# Patient Record
Sex: MALE | Race: OTHER | URBAN - METROPOLITAN AREA
[De-identification: names, ages, dates, MRNs, and addresses within clinical notes are randomized per-mention and may not be internally consistent; named-entity substitution may affect disease eponyms.]

---

## 2019-05-25 ENCOUNTER — EMERGENCY (EMERGENCY)
Facility: HOSPITAL | Age: 74
LOS: 0 days | Discharge: HOME | End: 2019-05-25
Attending: EMERGENCY MEDICINE | Admitting: EMERGENCY MEDICINE
Payer: MEDICARE

## 2019-05-25 VITALS
WEIGHT: 179.02 LBS | HEART RATE: 68 BPM | SYSTOLIC BLOOD PRESSURE: 134 MMHG | OXYGEN SATURATION: 98 % | DIASTOLIC BLOOD PRESSURE: 89 MMHG | TEMPERATURE: 97 F | RESPIRATION RATE: 18 BRPM | HEIGHT: 64 IN

## 2019-05-25 DIAGNOSIS — R21 RASH AND OTHER NONSPECIFIC SKIN ERUPTION: ICD-10-CM

## 2019-05-25 DIAGNOSIS — R06.02 SHORTNESS OF BREATH: ICD-10-CM

## 2019-05-25 DIAGNOSIS — R06.2 WHEEZING: ICD-10-CM

## 2019-05-25 DIAGNOSIS — J34.89 OTHER SPECIFIED DISORDERS OF NOSE AND NASAL SINUSES: ICD-10-CM

## 2019-05-25 DIAGNOSIS — Z95.5 PRESENCE OF CORONARY ANGIOPLASTY IMPLANT AND GRAFT: Chronic | ICD-10-CM

## 2019-05-25 DIAGNOSIS — J20.9 ACUTE BRONCHITIS, UNSPECIFIED: ICD-10-CM

## 2019-05-25 LAB
ALBUMIN SERPL ELPH-MCNC: 4 G/DL — SIGNIFICANT CHANGE UP (ref 3.5–5.2)
ALP SERPL-CCNC: 91 U/L — SIGNIFICANT CHANGE UP (ref 30–115)
ALT FLD-CCNC: 38 U/L — SIGNIFICANT CHANGE UP (ref 0–41)
ANION GAP SERPL CALC-SCNC: 8 MMOL/L — SIGNIFICANT CHANGE UP (ref 7–14)
APTT BLD: 30.3 SEC — SIGNIFICANT CHANGE UP (ref 27–39.2)
AST SERPL-CCNC: 23 U/L — SIGNIFICANT CHANGE UP (ref 0–41)
BASE EXCESS BLDV CALC-SCNC: 1.7 MMOL/L — SIGNIFICANT CHANGE UP (ref -2–2)
BASOPHILS # BLD AUTO: 0.03 K/UL — SIGNIFICANT CHANGE UP (ref 0–0.2)
BASOPHILS NFR BLD AUTO: 0.4 % — SIGNIFICANT CHANGE UP (ref 0–1)
BILIRUB SERPL-MCNC: 0.4 MG/DL — SIGNIFICANT CHANGE UP (ref 0.2–1.2)
BUN SERPL-MCNC: 11 MG/DL — SIGNIFICANT CHANGE UP (ref 10–20)
CA-I SERPL-SCNC: 1.22 MMOL/L — SIGNIFICANT CHANGE UP (ref 1.12–1.3)
CALCIUM SERPL-MCNC: 8.9 MG/DL — SIGNIFICANT CHANGE UP (ref 8.5–10.1)
CHLORIDE SERPL-SCNC: 106 MMOL/L — SIGNIFICANT CHANGE UP (ref 98–110)
CO2 SERPL-SCNC: 25 MMOL/L — SIGNIFICANT CHANGE UP (ref 17–32)
CREAT SERPL-MCNC: 0.9 MG/DL — SIGNIFICANT CHANGE UP (ref 0.7–1.5)
EOSINOPHIL # BLD AUTO: 0.2 K/UL — SIGNIFICANT CHANGE UP (ref 0–0.7)
EOSINOPHIL NFR BLD AUTO: 2.9 % — SIGNIFICANT CHANGE UP (ref 0–8)
GAS PNL BLDV: 142 MMOL/L — SIGNIFICANT CHANGE UP (ref 136–145)
GAS PNL BLDV: SIGNIFICANT CHANGE UP
GAS PNL BLDV: SIGNIFICANT CHANGE UP
GLUCOSE SERPL-MCNC: 89 MG/DL — SIGNIFICANT CHANGE UP (ref 70–99)
HCO3 BLDV-SCNC: 30 MMOL/L — HIGH (ref 22–29)
HCT VFR BLD CALC: 50.2 % — SIGNIFICANT CHANGE UP (ref 42–52)
HCT VFR BLDA CALC: 50.8 % — HIGH (ref 34–44)
HGB BLD CALC-MCNC: 16.6 G/DL — SIGNIFICANT CHANGE UP (ref 14–18)
HGB BLD-MCNC: 16.1 G/DL — SIGNIFICANT CHANGE UP (ref 14–18)
IMM GRANULOCYTES NFR BLD AUTO: 0.4 % — HIGH (ref 0.1–0.3)
INR BLD: 0.96 RATIO — SIGNIFICANT CHANGE UP (ref 0.65–1.3)
LACTATE BLDV-MCNC: 1.3 MMOL/L — SIGNIFICANT CHANGE UP (ref 0.5–1.6)
LYMPHOCYTES # BLD AUTO: 0.94 K/UL — LOW (ref 1.2–3.4)
LYMPHOCYTES # BLD AUTO: 13.5 % — LOW (ref 20.5–51.1)
MCHC RBC-ENTMCNC: 30.1 PG — SIGNIFICANT CHANGE UP (ref 27–31)
MCHC RBC-ENTMCNC: 32.1 G/DL — SIGNIFICANT CHANGE UP (ref 32–37)
MCV RBC AUTO: 94 FL — SIGNIFICANT CHANGE UP (ref 80–94)
MONOCYTES # BLD AUTO: 0.63 K/UL — HIGH (ref 0.1–0.6)
MONOCYTES NFR BLD AUTO: 9.1 % — SIGNIFICANT CHANGE UP (ref 1.7–9.3)
NEUTROPHILS # BLD AUTO: 5.12 K/UL — SIGNIFICANT CHANGE UP (ref 1.4–6.5)
NEUTROPHILS NFR BLD AUTO: 73.7 % — SIGNIFICANT CHANGE UP (ref 42.2–75.2)
NRBC # BLD: 0 /100 WBCS — SIGNIFICANT CHANGE UP (ref 0–0)
PCO2 BLDV: 61 MMHG — HIGH (ref 41–51)
PH BLDV: 7.3 — SIGNIFICANT CHANGE UP (ref 7.26–7.43)
PLATELET # BLD AUTO: 150 K/UL — SIGNIFICANT CHANGE UP (ref 130–400)
PO2 BLDV: 16 MMHG — LOW (ref 20–40)
POTASSIUM BLDV-SCNC: 4.4 MMOL/L — SIGNIFICANT CHANGE UP (ref 3.3–5.6)
POTASSIUM SERPL-MCNC: 5.9 MMOL/L — HIGH (ref 3.5–5)
POTASSIUM SERPL-SCNC: 5.9 MMOL/L — HIGH (ref 3.5–5)
PROT SERPL-MCNC: 6.5 G/DL — SIGNIFICANT CHANGE UP (ref 6–8)
PROTHROM AB SERPL-ACNC: 11.1 SEC — SIGNIFICANT CHANGE UP (ref 9.95–12.87)
RBC # BLD: 5.34 M/UL — SIGNIFICANT CHANGE UP (ref 4.7–6.1)
RBC # FLD: 14 % — SIGNIFICANT CHANGE UP (ref 11.5–14.5)
SAO2 % BLDV: 22 % — SIGNIFICANT CHANGE UP
SODIUM SERPL-SCNC: 139 MMOL/L — SIGNIFICANT CHANGE UP (ref 135–146)
WBC # BLD: 6.95 K/UL — SIGNIFICANT CHANGE UP (ref 4.8–10.8)
WBC # FLD AUTO: 6.95 K/UL — SIGNIFICANT CHANGE UP (ref 4.8–10.8)

## 2019-05-25 PROCEDURE — 71046 X-RAY EXAM CHEST 2 VIEWS: CPT | Mod: 26

## 2019-05-25 PROCEDURE — 99284 EMERGENCY DEPT VISIT MOD MDM: CPT

## 2019-05-25 RX ORDER — ACETAMINOPHEN WITH CODEINE 300MG-30MG
1 TABLET ORAL ONCE
Refills: 0 | Status: DISCONTINUED | OUTPATIENT
Start: 2019-05-25 | End: 2019-05-25

## 2019-05-25 RX ORDER — SODIUM CHLORIDE 9 MG/ML
1000 INJECTION INTRAMUSCULAR; INTRAVENOUS; SUBCUTANEOUS ONCE
Refills: 0 | Status: COMPLETED | OUTPATIENT
Start: 2019-05-25 | End: 2019-05-25

## 2019-05-25 RX ORDER — IPRATROPIUM/ALBUTEROL SULFATE 18-103MCG
3 AEROSOL WITH ADAPTER (GRAM) INHALATION
Refills: 0 | Status: COMPLETED | OUTPATIENT
Start: 2019-05-25 | End: 2019-05-25

## 2019-05-25 RX ORDER — DEXAMETHASONE 0.5 MG/5ML
1 ELIXIR ORAL
Qty: 3 | Refills: 0
Start: 2019-05-25 | End: 2019-05-27

## 2019-05-25 RX ORDER — AZITHROMYCIN 500 MG/1
1 TABLET, FILM COATED ORAL
Qty: 6 | Refills: 0
Start: 2019-05-25 | End: 2019-05-29

## 2019-05-25 RX ORDER — ALBUTEROL 90 UG/1
1 AEROSOL, METERED ORAL ONCE
Refills: 0 | Status: COMPLETED | OUTPATIENT
Start: 2019-05-25 | End: 2019-05-25

## 2019-05-25 RX ORDER — DEXAMETHASONE 0.5 MG/5ML
10 ELIXIR ORAL ONCE
Refills: 0 | Status: COMPLETED | OUTPATIENT
Start: 2019-05-25 | End: 2019-05-25

## 2019-05-25 RX ADMIN — SODIUM CHLORIDE 1000 MILLILITER(S): 9 INJECTION INTRAMUSCULAR; INTRAVENOUS; SUBCUTANEOUS at 20:15

## 2019-05-25 RX ADMIN — Medication 3 MILLILITER(S): at 20:00

## 2019-05-25 RX ADMIN — Medication 3 MILLILITER(S): at 20:20

## 2019-05-25 RX ADMIN — Medication 10 MILLIGRAM(S): at 20:15

## 2019-05-25 RX ADMIN — ALBUTEROL 1 PUFF(S): 90 AEROSOL, METERED ORAL at 22:12

## 2019-05-25 RX ADMIN — Medication 1 TABLET(S): at 20:46

## 2019-05-25 RX ADMIN — Medication 3 MILLILITER(S): at 20:40

## 2019-05-25 NOTE — ED PROVIDER NOTE - PMH
CAD (coronary artery disease)    GERD (gastroesophageal reflux disease)    HTN (hypertension)    MI (mitral incompetence)

## 2019-05-25 NOTE — ED PROVIDER NOTE - ATTENDING CONTRIBUTION TO CARE
74 yo m with pmh of htn, hld, cad with 2 stents on asa, presents with c/o cough, sob and wheezing x 1 week.  pt admits productive cough, greenish sputum.  no chest pain, no abd pain, no fever, no chills.  cannot recall any sick contact, but has been travelling between several relatives houses.  denies leg swelling or pain.  pt admits has had bronchitis in the past.  former smoker.  exam: nad, ncat, perrl, eomi, mmm, rrr, b/l wheezing and mild ronchi, abd soft, nt,nd aox3, imp: pt with cough, sob, wheezing and ronchi, likely bronchitis, former smoker, will start on azithromycin also, nebs and steroids

## 2019-05-25 NOTE — ED PROVIDER NOTE - CLINICAL SUMMARY MEDICAL DECISION MAKING FREE TEXT BOX
Pt with cough and wheezing x 1 week, likely bronchitis, feels improved after nebs and steroids.  rx for zpack and to continue inhaler at home

## 2019-05-25 NOTE — ED PROVIDER NOTE - PHYSICAL EXAMINATION
CONSTITUTIONAL: Well-appearing; well-nourished; in no apparent distress.   EYES: PERRL; EOM intact.   ENT: + rhinorrhea; normal pharynx with no tonsillar hypertrophy.   CARDIOVASCULAR: Normal S1, S2; no murmurs, rubs, or gallops.   RESPIRATORY: + mild diffuse wheezing with productive coughing. no rhonchi. Good air movement. speaking full sentence.   GI/: Normal bowel sounds; non-distended; non-tender; no palpable organomegaly.   MS: No evidence of trauma or deformity to extremities. no calf swelling and tenderness.   SKIN: few small petechial rash noted to dorsal aspect of b/l forearm. non-tender. no swelling.   NEURO/PSYCH: A & O x 4; grossly unremarkable.

## 2019-05-25 NOTE — ED ADULT NURSE NOTE - OBJECTIVE STATEMENT
patient complaints of productive cough, congestion and bilateral wheezing. Patient complaints of bilateral rash on both arms.  Patient denies chest pain.  Patient airway patent and no SOB noted. Patient oxygen Sat= 100% on room air.

## 2019-05-25 NOTE — ED PROVIDER NOTE - PROGRESS NOTE DETAILS
Feeling better. will d/c. encourage return for any worsening symptoms. noted elevated PCO2 in [facility] (compensated). likely underlying undiagnosed emphysema/copd. encourage patient to f/u with pulmonary when he gets back to [place] in 3 days

## 2019-05-25 NOTE — ED ADULT NURSE NOTE - CHPI ED NUR SYMPTOMS NEG
no decreased eating/drinking/no chills/no tingling/no vomiting/no weakness/no fever/no pain/no nausea/no dizziness

## 2019-05-25 NOTE — ED PROVIDER NOTE - OBJECTIVE STATEMENT
72 yo male hx of HTN/HLD/GERD/CAD/MI present c/o coughing/wheezing/sob worsening over the past week. noted wheezing and productive green sputum so he came to ED for evaluation. denies fever/chill/chest pain/abd pain/n/v/d/urinayr sxs. Recent came from [place] 3 weeks ago. denies sick contact.   also reported rash to his both forearm for few days. denies injury and trauma. non painful or itchy rash. patient took aspirin but stopped 2 weeks ago 2/2 stomach upset.

## 2019-05-25 NOTE — ED ADULT NURSE NOTE - BREATHING, MLM
Spontaneous, unlabored and symmetrical decreased john/decreased step length/decreased stride length/decreased weight-shifting ability

## 2019-05-25 NOTE — ED PROVIDER NOTE - NS ED ROS FT
Constitutional: no fever, chills, no recent weight loss, change in appetite or malaise  Eyes: no redness/discharge/pain/vision changes  ENT: + rhinorrhea no ear pain/sore throat  Cardiac: No chest pain, SOB or edema.  Respiratory: see HPI  GI: No nausea, vomiting, diarrhea or abdominal pain.  : No dysuria, frequency, urgency or hematuria  MS: no pain to back or extremities, no loss of ROM, no weakness  Neuro: No headache or weakness. No LOC.  Skin: + skin rash.  Endocrine: No history of thyroid disease or diabetes.  Except as documented in the HPI, all other systems are negative.

## 2024-09-20 NOTE — ED PROVIDER NOTE - NSFOLLOWUPINSTRUCTIONS_ED_ALL_ED_FT
Acute Bronchitis    WHAT YOU NEED TO KNOW:    Acute bronchitis is swelling and irritation in the air passages of your lungs. This irritation may cause you to cough or have other breathing problems. Acute bronchitis often starts because of another illness, such as a cold or the flu. The illness spreads from your nose and throat to your windpipe and airways. Bronchitis is often called a chest cold. Acute bronchitis lasts about 3 to 6 weeks and is usually not a serious illness. Your cough can last for several weeks.     DISCHARGE INSTRUCTIONS:    Return to the emergency department if:   You cough up blood.  Your lips or fingernails turn blue.  You feel like you are not getting enough air when you breathe.    Contact your healthcare provider if:  You have a fever.  Your breathing problems do not go away or get worse.  Your cough does not get better within 4 weeks.  You have questions or concerns about your condition or care.    Self-care:     Get more rest. Rest helps your body to heal. Slowly start to do more each day. Rest when you feel it is needed.    Avoid irritants in the air. Avoid chemicals, fumes, and dust. Wear a face mask if you must work around dust or fumes. Stay inside on days when air pollution levels are high. If you have allergies, stay inside when pollen counts are high. Do not use aerosol products, such as spray-on deodorant, bug spray, and hair spray.    Do not smoke or be around others who smoke. Nicotine and other chemicals in cigarettes and cigars damages the cilia that move mucus out of your lungs. Ask your healthcare provider for information if you currently smoke and need help to quit. E-cigarettes or smokeless tobacco still contain nicotine. Talk to your healthcare provider before you use these products.     Drink liquids as directed. Liquids help keep your air passages moist and help you cough up mucus. You may need to drink more liquids when you have acute bronchitis. Ask how much liquid to drink each day and which liquids are best for you.    Use a humidifier or vaporizer. Use a cool mist humidifier or a vaporizer to increase air moisture in your home. This may make it easier for you to breathe and help decrease your cough.     Decrease risk for acute bronchitis:     Get the vaccinations you need. Ask your healthcare provider if you should get vaccinated against the flu or pneumonia.    Prevent the spread of germs. You can decrease your risk of acute bronchitis and other illnesses by doing the following:   Wash your hands often with soap and water. Carry germ-killing hand lotion or gel with you. You can use the lotion or gel to clean your hands when soap and water are not available.    Do not touch your eyes, nose, or mouth unless you have washed your hands first.    Always cover your mouth when you cough to prevent the spread of germs. It is best to cough into a tissue or your shirt sleeve instead of into your hand. Ask those around you cover their mouths when they cough.    Try to avoid people who have a cold or the flu. If you are sick, stay away from others as much as possible.    Medicines: Your healthcare provider may give you any of the following:     Ibuprofen or acetaminophen are medicines that help lower your fever. They are available without a doctor's order. Ask your healthcare provider which medicine is right for you. Ask how much to take and how often to take it. Follow directions. These medicines can cause stomach bleeding if not taken correctly. Ibuprofen can cause kidney damage. Do not take ibuprofen if you have kidney disease, an ulcer, or allergies to aspirin. Acetaminophen can cause liver damage. Do not take more than 4,000 milligrams in 24 hours.     Decongestants help loosen mucus in your lungs and make it easier to cough up. This can help you breathe easier.    Cough suppressants decrease your urge to cough. If your cough produces mucus, do not take a cough suppressant unless your healthcare provider tells you to. Your healthcare provider may suggest that you take a cough suppressant at night so you can rest.    Inhalers may be given. Your healthcare provider may give you one or more inhalers to help you breathe easier and cough less. An inhaler gives your medicine to open your airways. Ask your healthcare provider to show you how to use your inhaler correctly.     Take your medicine as directed. Contact your healthcare provider if you think your medicine is not helping or if you have side effects. Tell him of her if you are allergic to any medicine. Keep a list of the medicines, vitamins, and herbs you take. Include the amounts, and when and why you take them. Bring the list or the pill bottles to follow-up visits. Carry your medicine list with you in case of an emergency.    Follow up with your healthcare provider as directed: Write down questions you have so you will remember to ask them during your follow-up visits.     Petechial Rash    A petechia is a small red or purple spot on the skin, caused by a minor bleed from broken capillary blood vessels. Petechia refers to one of the three descriptive types of bleeding into the skin differentiated by size, the other two being purpura and ecchymosis. Petechiae are by definition less than 3 mm.    The rash were noted to both forearm. no bleeding and pain. Labs were unremarkable. Follow up with primary care physician.
None
